# Patient Record
Sex: FEMALE | Race: WHITE | NOT HISPANIC OR LATINO | Employment: FULL TIME | URBAN - NONMETROPOLITAN AREA
[De-identification: names, ages, dates, MRNs, and addresses within clinical notes are randomized per-mention and may not be internally consistent; named-entity substitution may affect disease eponyms.]

---

## 2020-08-15 ENCOUNTER — APPOINTMENT (EMERGENCY)
Dept: RADIOLOGY | Facility: HOSPITAL | Age: 45
End: 2020-08-15
Payer: COMMERCIAL

## 2020-08-15 ENCOUNTER — HOSPITAL ENCOUNTER (EMERGENCY)
Facility: HOSPITAL | Age: 45
Discharge: HOME | End: 2020-08-15
Attending: EMERGENCY MEDICINE | Admitting: EMERGENCY MEDICINE
Payer: COMMERCIAL

## 2020-08-15 VITALS
HEIGHT: 70 IN | WEIGHT: 235 LBS | SYSTOLIC BLOOD PRESSURE: 142 MMHG | HEART RATE: 61 BPM | TEMPERATURE: 97.1 F | DIASTOLIC BLOOD PRESSURE: 88 MMHG | BODY MASS INDEX: 33.64 KG/M2 | RESPIRATION RATE: 18 BRPM | OXYGEN SATURATION: 96 %

## 2020-08-15 DIAGNOSIS — R07.89 ATYPICAL CHEST PAIN: Primary | ICD-10-CM

## 2020-08-15 LAB
ALBUMIN SERPL BCP-MCNC: 3.9 G/DL (ref 3.5–5)
ALP SERPL-CCNC: 59 U/L (ref 46–116)
ALT SERPL W P-5'-P-CCNC: 30 U/L (ref 12–78)
ANION GAP SERPL CALCULATED.3IONS-SCNC: 9 MMOL/L (ref 4–13)
AST SERPL W P-5'-P-CCNC: 15 U/L (ref 5–45)
BASOPHILS # BLD AUTO: 0.04 THOUSANDS/ΜL (ref 0–0.1)
BASOPHILS NFR BLD AUTO: 1 % (ref 0–1)
BILIRUB SERPL-MCNC: 0.25 MG/DL (ref 0.2–1)
BUN SERPL-MCNC: 15 MG/DL (ref 5–25)
CALCIUM SERPL-MCNC: 9 MG/DL (ref 8.3–10.1)
CHLORIDE SERPL-SCNC: 101 MMOL/L (ref 100–108)
CO2 SERPL-SCNC: 28 MMOL/L (ref 21–32)
CREAT SERPL-MCNC: 1.09 MG/DL (ref 0.6–1.3)
EOSINOPHIL # BLD AUTO: 0.09 THOUSAND/ΜL (ref 0–0.61)
EOSINOPHIL NFR BLD AUTO: 1 % (ref 0–6)
ERYTHROCYTE [DISTWIDTH] IN BLOOD BY AUTOMATED COUNT: 13.2 % (ref 11.6–15.1)
GFR SERPL CREATININE-BSD FRML MDRD: 62 ML/MIN/1.73SQ M
GLUCOSE SERPL-MCNC: 95 MG/DL (ref 65–140)
HCT VFR BLD AUTO: 42.7 % (ref 34.8–46.1)
HGB BLD-MCNC: 14.1 G/DL (ref 11.5–15.4)
IMM GRANULOCYTES # BLD AUTO: 0.02 THOUSAND/UL (ref 0–0.2)
IMM GRANULOCYTES NFR BLD AUTO: 0 % (ref 0–2)
LYMPHOCYTES # BLD AUTO: 1.66 THOUSANDS/ΜL (ref 0.6–4.47)
LYMPHOCYTES NFR BLD AUTO: 20 % (ref 14–44)
MCH RBC QN AUTO: 30.1 PG (ref 26.8–34.3)
MCHC RBC AUTO-ENTMCNC: 33 G/DL (ref 31.4–37.4)
MCV RBC AUTO: 91 FL (ref 82–98)
MONOCYTES # BLD AUTO: 0.59 THOUSAND/ΜL (ref 0.17–1.22)
MONOCYTES NFR BLD AUTO: 7 % (ref 4–12)
NEUTROPHILS # BLD AUTO: 5.82 THOUSANDS/ΜL (ref 1.85–7.62)
NEUTS SEG NFR BLD AUTO: 71 % (ref 43–75)
NRBC BLD AUTO-RTO: 0 /100 WBCS
PLATELET # BLD AUTO: 223 THOUSANDS/UL (ref 149–390)
PMV BLD AUTO: 9.6 FL (ref 8.9–12.7)
POTASSIUM SERPL-SCNC: 3.6 MMOL/L (ref 3.5–5.3)
PROT SERPL-MCNC: 7.3 G/DL (ref 6.4–8.2)
RBC # BLD AUTO: 4.68 MILLION/UL (ref 3.81–5.12)
SODIUM SERPL-SCNC: 138 MMOL/L (ref 136–145)
TROPONIN I SERPL-MCNC: <0.02 NG/ML
TROPONIN I SERPL-MCNC: <0.02 NG/ML
WBC # BLD AUTO: 8.22 THOUSAND/UL (ref 4.31–10.16)

## 2020-08-15 PROCEDURE — 99285 EMERGENCY DEPT VISIT HI MDM: CPT

## 2020-08-15 PROCEDURE — 99285 EMERGENCY DEPT VISIT HI MDM: CPT | Performed by: EMERGENCY MEDICINE

## 2020-08-15 PROCEDURE — 96374 THER/PROPH/DIAG INJ IV PUSH: CPT

## 2020-08-15 PROCEDURE — 84484 ASSAY OF TROPONIN QUANT: CPT | Performed by: EMERGENCY MEDICINE

## 2020-08-15 PROCEDURE — 93005 ELECTROCARDIOGRAM TRACING: CPT

## 2020-08-15 PROCEDURE — 71045 X-RAY EXAM CHEST 1 VIEW: CPT

## 2020-08-15 PROCEDURE — 80053 COMPREHEN METABOLIC PANEL: CPT | Performed by: EMERGENCY MEDICINE

## 2020-08-15 PROCEDURE — 36415 COLL VENOUS BLD VENIPUNCTURE: CPT | Performed by: EMERGENCY MEDICINE

## 2020-08-15 PROCEDURE — 85025 COMPLETE CBC W/AUTO DIFF WBC: CPT | Performed by: EMERGENCY MEDICINE

## 2020-08-15 RX ORDER — ASPIRIN 81 MG/1
324 TABLET, CHEWABLE ORAL ONCE
Status: COMPLETED | OUTPATIENT
Start: 2020-08-15 | End: 2020-08-15

## 2020-08-15 RX ORDER — OMEPRAZOLE 10 MG/1
CAPSULE, DELAYED RELEASE ORAL
COMMUNITY
Start: 2020-03-17

## 2020-08-15 RX ORDER — ONDANSETRON 2 MG/ML
4 INJECTION INTRAMUSCULAR; INTRAVENOUS ONCE
Status: COMPLETED | OUTPATIENT
Start: 2020-08-15 | End: 2020-08-15

## 2020-08-15 RX ORDER — ACETAMINOPHEN 325 MG/1
650 TABLET ORAL ONCE
Status: COMPLETED | OUTPATIENT
Start: 2020-08-15 | End: 2020-08-15

## 2020-08-15 RX ORDER — NITROGLYCERIN 0.4 MG/1
0.4 TABLET SUBLINGUAL ONCE
Status: COMPLETED | OUTPATIENT
Start: 2020-08-15 | End: 2020-08-15

## 2020-08-15 RX ORDER — MAG HYDROX/ALUMINUM HYD/SIMETH 400-400-40
5000 SUSPENSION, ORAL (FINAL DOSE FORM) ORAL
COMMUNITY

## 2020-08-15 RX ADMIN — NITROGLYCERIN 0.4 MG: 0.4 TABLET SUBLINGUAL at 16:27

## 2020-08-15 RX ADMIN — ONDANSETRON 4 MG: 2 INJECTION INTRAMUSCULAR; INTRAVENOUS at 16:42

## 2020-08-15 RX ADMIN — ASPIRIN 81 MG 324 MG: 81 TABLET ORAL at 16:26

## 2020-08-15 RX ADMIN — ACETAMINOPHEN 650 MG: 325 TABLET ORAL at 16:26

## 2020-08-15 NOTE — ED PROVIDER NOTES
History  Chief Complaint   Patient presents with    Chest Pain     chest pain with elevated blood pressure and light headed     This is a 57-year-old female presents emergency room with a chief complaint of chest pressure  Patient reports that earlier today she started to have a headache which was somewhat atypical for her but she did believe it to be a rat there are normal headache but it got progressively worse  She took some ibuprofen and that actually improved but she was still feeling somewhat lightheaded  About 2 hours prior to arrival she started to feel some chest pressure with radiation into her back  She denies any radiation of the discomfort into her arms or her neck  She denies any shortness of breath or diaphoresis or nausea  She does report that earlier over the last week or so she has been having some episodes where she felt a little bit dizzy and flushed  She took her blood pressure at home and found to be elevated in the 160s which was markedly abnormal for her and given this and the chest pressure she elected to come to the emergency room for further of valuation  She is having chest pressure currently        History provided by:  Patient  Chest Pain   Pain location:  Substernal area  Pain quality: pressure    Pain radiates to:  Upper back  Pain radiates to the back: yes    Pain severity:  Moderate  Onset quality:  Gradual  Duration:  2 hours  Timing:  Constant  Progression:  Unchanged  Chronicity:  New  Context: not breathing    Relieved by:  Nothing  Worsened by:  Nothing tried  Associated symptoms: anxiety, dizziness and headache    Associated symptoms: no abdominal pain, no back pain, no cough, no diaphoresis, no fatigue, no fever, no nausea, no near-syncope, no palpitations, no shortness of breath, no syncope, not vomiting and no weakness    Headaches:     Progression:  Resolved  Risk factors: no aortic disease, no diabetes mellitus, no high cholesterol, no hypertension and no smoking        Prior to Admission Medications   Prescriptions Last Dose Informant Patient Reported? Taking? Cholecalciferol (Vitamin D3) 125 MCG (5000 UT) CAPS   Yes No   Sig: Take 5,000 Units by mouth   omeprazole (PriLOSEC) 10 mg delayed release capsule   Yes Yes   Sig: Take by mouth      Facility-Administered Medications: None       Past Medical History:   Diagnosis Date    Anxiety     GERD (gastroesophageal reflux disease)     SVT (supraventricular tachycardia) (HCC)        Past Surgical History:   Procedure Laterality Date    APPENDECTOMY       SECTION      HYSTERECTOMY      OOPHORECTOMY Left        Family History   Problem Relation Age of Onset    Heart attack Father      I have reviewed and agree with the history as documented  E-Cigarette/Vaping    E-Cigarette Use Never User      E-Cigarette/Vaping Substances     Social History     Tobacco Use    Smoking status: Former Smoker    Smokeless tobacco: Never Used   Substance Use Topics    Alcohol use: Not Currently    Drug use: Not Currently       Review of Systems   Constitutional: Negative for activity change, diaphoresis, fatigue and fever  HENT: Negative for congestion, ear pain, rhinorrhea, sinus pressure and sore throat  Eyes: Negative  Respiratory: Positive for chest tightness  Negative for cough, shortness of breath and wheezing  Cardiovascular: Positive for chest pain  Negative for palpitations, leg swelling, syncope and near-syncope  Gastrointestinal: Negative for abdominal pain, diarrhea, nausea and vomiting  Endocrine: Negative  Genitourinary: Negative for decreased urine volume, dysuria and flank pain  Musculoskeletal: Negative for arthralgias, back pain and myalgias  Skin: Negative for pallor and rash  Allergic/Immunologic: Negative  Neurological: Positive for dizziness and headaches  Negative for weakness  Hematological: Negative  Psychiatric/Behavioral: Negative      All other systems reviewed and are negative  Physical Exam  Physical Exam  Vitals signs and nursing note reviewed  Constitutional:       Appearance: She is well-developed  HENT:      Head: Normocephalic and atraumatic  Eyes:      Pupils: Pupils are equal, round, and reactive to light  Neck:      Musculoskeletal: Normal range of motion and neck supple  Cardiovascular:      Rate and Rhythm: Normal rate and regular rhythm  Heart sounds: Normal heart sounds  No murmur  Pulmonary:      Effort: Pulmonary effort is normal  No respiratory distress  Breath sounds: Normal breath sounds  No stridor  No wheezing or rales  Chest:      Chest wall: No tenderness  Abdominal:      General: Bowel sounds are normal  There is no distension  Palpations: Abdomen is soft  Tenderness: There is no abdominal tenderness  There is no guarding or rebound  Musculoskeletal: Normal range of motion  General: No deformity  Skin:     General: Skin is warm and dry  Coloration: Skin is not pale  Findings: No rash  Neurological:      Mental Status: She is alert and oriented to person, place, and time  Cranial Nerves: No cranial nerve deficit     Psychiatric:         Mood and Affect: Mood normal          Vital Signs  ED Triage Vitals   Temperature Pulse Respirations Blood Pressure SpO2   08/15/20 1602 08/15/20 1602 08/15/20 1602 08/15/20 1602 08/15/20 1602   (!) 97 1 °F (36 2 °C) 76 16 168/98 98 %      Temp src Heart Rate Source Patient Position - Orthostatic VS BP Location FiO2 (%)   -- 08/15/20 1636 08/15/20 1636 08/15/20 1636 --    Monitor Sitting Right arm       Pain Score       08/15/20 1602       3           Vitals:    08/15/20 1602 08/15/20 1636 08/15/20 1835 08/15/20 1900   BP: 168/98 137/85 125/78 142/88   Pulse: 76 79 67 61   Patient Position - Orthostatic VS:  Sitting Sitting Lying         Visual Acuity      ED Medications  Medications   aspirin chewable tablet 324 mg (324 mg Oral Given 8/15/20 1626)   ondansetron (ZOFRAN) injection 4 mg (4 mg Intravenous Given 8/15/20 1642)   nitroglycerin (NITROSTAT) SL tablet 0 4 mg (0 4 mg Sublingual Given 8/15/20 1627)   acetaminophen (TYLENOL) tablet 650 mg (650 mg Oral Given 8/15/20 1626)       Diagnostic Studies  Results Reviewed     Procedure Component Value Units Date/Time    Troponin I [[id number]]  (Normal) Collected:  08/15/20 1837    Lab Status:  Final result Specimen:  Blood from Arm, Left Updated:  08/15/20 1859     Troponin I <0 02 ng/mL     Troponin I [[id number]]  (Normal) Collected:  08/15/20 1643    Lab Status:  Final result Specimen:  Blood from Arm, Left Updated:  08/15/20 1709     Troponin I <0 02 ng/mL     Comprehensive metabolic panel [[id number]] Collected:  08/15/20 1643    Lab Status:  Final result Specimen:  Blood from Arm, Left Updated:  08/15/20 1704     Sodium 138 mmol/L      Potassium 3 6 mmol/L      Chloride 101 mmol/L      CO2 28 mmol/L      ANION GAP 9 mmol/L      BUN 15 mg/dL      Creatinine 1 09 mg/dL      Glucose 95 mg/dL      Calcium 9 0 mg/dL      AST 15 U/L      ALT 30 U/L      Alkaline Phosphatase 59 U/L      Total Protein 7 3 g/dL      Albumin 3 9 g/dL      Total Bilirubin 0 25 mg/dL      eGFR 62 ml/min/1 73sq m     Narrative:       Greg guidelines for Chronic Kidney Disease (CKD):     Stage 1 with normal or high GFR (GFR > 90 mL/min/1 73 square meters)    Stage 2 Mild CKD (GFR = 60-89 mL/min/1 73 square meters)    Stage 3A Moderate CKD (GFR = 45-59 mL/min/1 73 square meters)    Stage 3B Moderate CKD (GFR = 30-44 mL/min/1 73 square meters)    Stage 4 Severe CKD (GFR = 15-29 mL/min/1 73 square meters)    Stage 5 End Stage CKD (GFR <15 mL/min/1 73 square meters)  Note: GFR calculation is accurate only with a steady state creatinine    CBC and differential [[id number]] Collected:  08/15/20 1643    Lab Status:  Final result Specimen:  Blood from Arm, Left Updated:  08/15/20 1649     WBC 8 22 Thousand/uL      RBC 4 68 Million/uL      Hemoglobin 14 1 g/dL      Hematocrit 42 7 %      MCV 91 fL      MCH 30 1 pg      MCHC 33 0 g/dL      RDW 13 2 %      MPV 9 6 fL      Platelets 918 Thousands/uL      nRBC 0 /100 WBCs      Neutrophils Relative 71 %      Immat GRANS % 0 %      Lymphocytes Relative 20 %      Monocytes Relative 7 %      Eosinophils Relative 1 %      Basophils Relative 1 %      Neutrophils Absolute 5 82 Thousands/µL      Immature Grans Absolute 0 02 Thousand/uL      Lymphocytes Absolute 1 66 Thousands/µL      Monocytes Absolute 0 59 Thousand/µL      Eosinophils Absolute 0 09 Thousand/µL      Basophils Absolute 0 04 Thousands/µL                  XR chest 1 view portable   Final Result by Isaias Durbin MD (08/15 1804)      No acute cardiopulmonary disease  Workstation performed: [id number]                    Procedures  ECG 12 Lead Documentation Only    Date/Time: 8/15/2020 4:22 PM  Performed by: Michelel Snowden DO  Authorized by: Michelle Snowden DO     ECG reviewed by me, the ED Provider: yes    Patient location:  ED  Previous ECG:     Previous ECG:  Unavailable    Comparison to cardiac monitor: Yes    Interpretation:     Interpretation: normal    Rate:     ECG rate assessment: normal    Rhythm:     Rhythm: sinus rhythm    Ectopy:     Ectopy: none    QRS:     QRS axis:  Normal  Conduction:     Conduction: normal    ST segments:     ST segments:  Normal  T waves:     T waves: normal               ED Course  ED Course as of Aug 15 2218   Sat Aug 15, 2020   1713 Troponin I: <0 02   1811 CXR negative      1906 Second troponin negative  Patient is stable for discharge  Discussed with her the importance of follow-up with both PCP and/or Cardiology  US AUDIT      Most Recent Value   Initial Alcohol Screen: US AUDIT-C    1  How often do you have a drink containing alcohol?  0 Filed at: 08/15/2020 1603   2   How many drinks containing alcohol do you have on a typical day you are drinking? 0 Filed at: 08/15/2020 1603   3a  Male UNDER 65: How often do you have five or more drinks on one occasion? 0 Filed at: 08/15/2020 1603   3b  FEMALE Any Age, or MALE 65+: How often do you have 4 or more drinks on one occassion? 0 Filed at: 08/15/2020 1603   Audit-C Score  0 Filed at: 08/15/2020 1603            HEART Risk Score      Most Recent Value   Heart Score Risk Calculator   History  1 Filed at: 08/15/2020 1623   ECG  0 Filed at: 08/15/2020 1623   Age  0 Filed at: 08/15/2020 1623   Risk Factors  1 Filed at: 08/15/2020 1623   Troponin  0 Filed at: 08/15/2020 1623   HEART Score  2 Filed at: 08/15/2020 1623            MEGHNA/DAST-10      Most Recent Value   How many times in the past year have you    Used an illegal drug or used a prescription medication for non-medical reasons? Never Filed at: 08/15/2020 1604                                MDM      Disposition  Final diagnoses:   Atypical chest pain     Time reflects when diagnosis was documented in both MDM as applicable and the Disposition within this note     Time User Action Codes Description Comment    8/15/2020  6:55 PM Maggie Gibbs Add [R07 89] Atypical chest pain       ED Disposition     ED Disposition Condition Date/Time Comment    Discharge Stable Sat Aug 15, 2020  7:06 PM Haskel Bamberger discharge to home/self care  Follow-up Information    None         Discharge Medication List as of 8/15/2020  7:06 PM      CONTINUE these medications which have NOT CHANGED    Details   omeprazole (PriLOSEC) 10 mg delayed release capsule Take by mouth, Starting Tue 3/17/2020, Historical Med      Cholecalciferol (Vitamin D3) 125 MCG (5000 UT) CAPS Take 5,000 Units by mouth, Historical Med           No discharge procedures on file      PDMP Review     None          ED Provider  Electronically Signed by           Janelle Motley DO  08/15/20 8019

## 2020-08-15 NOTE — DISCHARGE INSTRUCTIONS
We recommend follow-up with your primary care provider to have your blood pressure recheck  Medications may need to be adjusted  Additionally, further testing may be recommended  Thank you for choosing the emergency department at [facility]  We appreciated the opportunity and privilege to address your healthcare needs  We remain available to you should you require additional evaluation or assistance  We value your feedback and would appreciate the opportunity to address anything you identified as an opportunity to improve or where we excelled  If there are colleagues who deserve special recognition, please let us know! We hope you are feeling better soon!

## 2020-08-17 LAB
ATRIAL RATE: 67 BPM
P AXIS: 64 DEGREES
PR INTERVAL: 136 MS
QRS AXIS: 88 DEGREES
QRSD INTERVAL: 82 MS
QT INTERVAL: 394 MS
QTC INTERVAL: 416 MS
T WAVE AXIS: 77 DEGREES
VENTRICULAR RATE: 67 BPM

## 2020-08-17 PROCEDURE — 93010 ELECTROCARDIOGRAM REPORT: CPT | Performed by: INTERNAL MEDICINE

## 2024-11-24 ENCOUNTER — OFFICE VISIT (OUTPATIENT)
Dept: URGENT CARE | Facility: CLINIC | Age: 49
End: 2024-11-24
Payer: COMMERCIAL

## 2024-11-24 VITALS
SYSTOLIC BLOOD PRESSURE: 116 MMHG | RESPIRATION RATE: 16 BRPM | BODY MASS INDEX: 32.21 KG/M2 | OXYGEN SATURATION: 98 % | DIASTOLIC BLOOD PRESSURE: 62 MMHG | WEIGHT: 225 LBS | HEIGHT: 70 IN | TEMPERATURE: 98 F

## 2024-11-24 DIAGNOSIS — S05.01XA CORNEAL ABRASION, RIGHT, INITIAL ENCOUNTER: Primary | ICD-10-CM

## 2024-11-24 PROCEDURE — S9083 URGENT CARE CENTER GLOBAL: HCPCS | Performed by: STUDENT IN AN ORGANIZED HEALTH CARE EDUCATION/TRAINING PROGRAM

## 2024-11-24 PROCEDURE — G0382 LEV 3 HOSP TYPE B ED VISIT: HCPCS | Performed by: STUDENT IN AN ORGANIZED HEALTH CARE EDUCATION/TRAINING PROGRAM

## 2024-11-24 RX ORDER — BUPROPION HYDROCHLORIDE 150 MG/1
1 TABLET, EXTENDED RELEASE ORAL DAILY
COMMUNITY

## 2024-11-24 RX ORDER — OFLOXACIN 3 MG/ML
1 SOLUTION/ DROPS OPHTHALMIC 4 TIMES DAILY
Qty: 5 ML | Refills: 0 | Status: SHIPPED | OUTPATIENT
Start: 2024-11-24 | End: 2024-11-29

## 2024-11-24 RX ORDER — DILTIAZEM HYDROCHLORIDE 240 MG/1
CAPSULE, EXTENDED RELEASE ORAL
COMMUNITY
Start: 2024-11-14

## 2024-11-24 NOTE — PROGRESS NOTES
[facility]        NAME: Keena Rodríguez is a 49 y.o. female  : 1975    MRN: [id number]  DATE: 2024  TIME: 11:52 AM    Assessment and Plan   Corneal abrasion, right, initial encounter [S05.01XA]  1. Corneal abrasion, right, initial encounter  ofloxacin (OCUFLOX) 0.3 % ophthalmic solution            Patient Instructions       Follow up with PCP in 3-5 days.  Proceed to  ER if symptoms worsen.    If tests have been performed at [facility], our office will contact you with results if changes need to be made to the care plan discussed with you at the visit.  [facility]    Chief Complaint     Chief Complaint   Patient presents with    Eye Problem     Started this am   Right eye pain, feels like something in eye  Unsure of how anything in right eye  No OTC         History of Present Illness       HPI    Review of Systems   Review of Systems      Current Medications       Current Outpatient Medications:     buPROPion (WELLBUTRIN SR) 150 mg 12 hr tablet, Take 1 tablet by mouth daily, Disp: , Rfl:     Cholecalciferol (Vitamin D3) 125 MCG (5000 UT) CAPS, Take 5,000 Units by mouth, Disp: , Rfl:     diltiazem (TIAZAC) 240 MG 24 hr capsule, , Disp: , Rfl:     ofloxacin (OCUFLOX) 0.3 % ophthalmic solution, Administer 1 drop to the right eye 4 (four) times a day for 5 days, Disp: 5 mL, Rfl: 0    omeprazole (PriLOSEC) 10 mg delayed release capsule, Take by mouth, Disp: , Rfl:     Current Allergies     Allergies as of 2024    (No Known Allergies)            The following portions of the patient's history were reviewed and updated as appropriate: allergies, current medications, past family history, past medical history, past social history, past surgical history and problem list.     Past Medical History:   Diagnosis Date    Anxiety     GERD (gastroesophageal reflux disease)     SVT (supraventricular tachycardia) ([facility])        Past Surgical History:  "  Procedure Laterality Date    APPENDECTOMY       SECTION      HYSTERECTOMY      OOPHORECTOMY Left        Family History   Problem Relation Age of Onset    Heart attack Father          Medications have been verified.        Objective   /62   Temp 98 °F (36.7 °C)   Resp 16   Ht 5' 10\" (1.778 m)   Wt 102 kg (225 lb)   SpO2 98%   BMI 32.28 kg/m²   No LMP recorded. Patient has had a hysterectomy.       Physical Exam     Physical Exam              " Tenderness: There is no abdominal tenderness. There is no guarding.   Psychiatric:         Behavior: Behavior normal.

## 2024-11-24 NOTE — LETTER
November 24, 2024     Patient: Keena Rodríguez   YOB: 1975   Date of Visit: 11/24/2024       To Whom It May Concern:    It is my medical opinion that Keena Rodríguez should avoid looking at computer screens as needed for the next 3 days. Please accommodate this request.     If you have any questions or concerns, please don't hesitate to call.         Sincerely,        DAVID Villasenor    CC: No Recipients